# Patient Record
Sex: MALE | Race: WHITE | NOT HISPANIC OR LATINO
[De-identification: names, ages, dates, MRNs, and addresses within clinical notes are randomized per-mention and may not be internally consistent; named-entity substitution may affect disease eponyms.]

---

## 2017-06-02 ENCOUNTER — APPOINTMENT (OUTPATIENT)
Dept: CARDIOLOGY | Facility: CLINIC | Age: 72
End: 2017-06-02

## 2017-07-21 ENCOUNTER — APPOINTMENT (OUTPATIENT)
Dept: CARDIOLOGY | Facility: CLINIC | Age: 72
End: 2017-07-21

## 2017-08-25 ENCOUNTER — APPOINTMENT (OUTPATIENT)
Dept: CARDIOLOGY | Facility: CLINIC | Age: 72
End: 2017-08-25
Payer: MEDICARE

## 2017-08-25 PROCEDURE — 99214 OFFICE O/P EST MOD 30 MIN: CPT

## 2017-12-15 ENCOUNTER — APPOINTMENT (OUTPATIENT)
Dept: CARDIOLOGY | Facility: CLINIC | Age: 72
End: 2017-12-15
Payer: MEDICARE

## 2017-12-15 VITALS
BODY MASS INDEX: 33.2 KG/M2 | OXYGEN SATURATION: 98 % | HEIGHT: 75 IN | DIASTOLIC BLOOD PRESSURE: 80 MMHG | HEART RATE: 72 BPM | RESPIRATION RATE: 16 BRPM | SYSTOLIC BLOOD PRESSURE: 130 MMHG | WEIGHT: 267 LBS

## 2017-12-15 DIAGNOSIS — Z87.891 PERSONAL HISTORY OF NICOTINE DEPENDENCE: ICD-10-CM

## 2017-12-15 DIAGNOSIS — K21.9 GASTRO-ESOPHAGEAL REFLUX DISEASE W/OUT ESOPHAGITIS: ICD-10-CM

## 2017-12-15 DIAGNOSIS — Z86.79 PERSONAL HISTORY OF OTHER DISEASES OF THE CIRCULATORY SYSTEM: ICD-10-CM

## 2017-12-15 DIAGNOSIS — Z78.9 OTHER SPECIFIED HEALTH STATUS: ICD-10-CM

## 2017-12-15 DIAGNOSIS — E78.5 HYPERLIPIDEMIA, UNSPECIFIED: ICD-10-CM

## 2017-12-15 DIAGNOSIS — Z86.39 PERSONAL HISTORY OF OTHER ENDOCRINE, NUTRITIONAL AND METABOLIC DISEASE: ICD-10-CM

## 2017-12-15 DIAGNOSIS — Z80.9 FAMILY HISTORY OF MALIGNANT NEOPLASM, UNSPECIFIED: ICD-10-CM

## 2017-12-15 DIAGNOSIS — Z87.442 PERSONAL HISTORY OF URINARY CALCULI: ICD-10-CM

## 2017-12-15 PROCEDURE — 99214 OFFICE O/P EST MOD 30 MIN: CPT

## 2017-12-15 RX ORDER — ALFUZOSIN HYDROCHLORIDE 10 MG/1
10 TABLET, EXTENDED RELEASE ORAL
Qty: 90 | Refills: 0 | Status: ACTIVE | COMMUNITY
Start: 2017-09-13

## 2017-12-15 RX ORDER — ASPIRIN ENTERIC COATED TABLETS 81 MG 81 MG/1
81 TABLET, DELAYED RELEASE ORAL DAILY
Refills: 0 | Status: ACTIVE | COMMUNITY

## 2017-12-15 RX ORDER — METOPROLOL SUCCINATE 50 MG/1
50 TABLET, EXTENDED RELEASE ORAL
Qty: 90 | Refills: 0 | Status: DISCONTINUED | COMMUNITY
Start: 2017-08-25 | End: 2017-12-15

## 2017-12-15 RX ORDER — DOXEPIN HYDROCHLORIDE 10 MG/1
10 CAPSULE ORAL
Qty: 30 | Refills: 0 | Status: DISCONTINUED | COMMUNITY
Start: 2017-09-14 | End: 2017-12-15

## 2018-05-11 ENCOUNTER — NON-APPOINTMENT (OUTPATIENT)
Age: 73
End: 2018-05-11

## 2018-05-11 ENCOUNTER — APPOINTMENT (OUTPATIENT)
Dept: CARDIOLOGY | Facility: CLINIC | Age: 73
End: 2018-05-11
Payer: MEDICARE

## 2018-05-11 VITALS
WEIGHT: 262 LBS | SYSTOLIC BLOOD PRESSURE: 118 MMHG | RESPIRATION RATE: 16 BRPM | BODY MASS INDEX: 32.58 KG/M2 | HEIGHT: 75 IN | HEART RATE: 68 BPM | DIASTOLIC BLOOD PRESSURE: 66 MMHG | OXYGEN SATURATION: 98 %

## 2018-05-11 PROCEDURE — 99214 OFFICE O/P EST MOD 30 MIN: CPT

## 2018-05-11 PROCEDURE — 93000 ELECTROCARDIOGRAM COMPLETE: CPT

## 2018-07-22 PROBLEM — Z78.9 ALCOHOL USE: Status: ACTIVE | Noted: 2017-12-15

## 2018-07-23 ENCOUNTER — APPOINTMENT (OUTPATIENT)
Dept: CARDIOLOGY | Facility: CLINIC | Age: 73
End: 2018-07-23
Payer: MEDICARE

## 2018-07-23 PROCEDURE — 93306 TTE W/DOPPLER COMPLETE: CPT

## 2018-08-02 ENCOUNTER — CLINICAL ADVICE (OUTPATIENT)
Age: 73
End: 2018-08-02

## 2018-08-24 ENCOUNTER — APPOINTMENT (OUTPATIENT)
Dept: CARDIOLOGY | Facility: CLINIC | Age: 73
End: 2018-08-24
Payer: MEDICARE

## 2018-08-24 VITALS
BODY MASS INDEX: 32.83 KG/M2 | HEIGHT: 75 IN | WEIGHT: 264 LBS | SYSTOLIC BLOOD PRESSURE: 120 MMHG | HEART RATE: 62 BPM | OXYGEN SATURATION: 98 % | DIASTOLIC BLOOD PRESSURE: 68 MMHG

## 2018-08-24 PROCEDURE — 99215 OFFICE O/P EST HI 40 MIN: CPT

## 2018-11-19 ENCOUNTER — RX RENEWAL (OUTPATIENT)
Age: 73
End: 2018-11-19

## 2019-05-17 ENCOUNTER — NON-APPOINTMENT (OUTPATIENT)
Age: 74
End: 2019-05-17

## 2019-05-17 ENCOUNTER — APPOINTMENT (OUTPATIENT)
Dept: CARDIOLOGY | Facility: CLINIC | Age: 74
End: 2019-05-17
Payer: MEDICARE

## 2019-05-17 VITALS
WEIGHT: 259 LBS | HEART RATE: 66 BPM | HEIGHT: 75 IN | BODY MASS INDEX: 32.2 KG/M2 | DIASTOLIC BLOOD PRESSURE: 66 MMHG | OXYGEN SATURATION: 96 % | SYSTOLIC BLOOD PRESSURE: 114 MMHG

## 2019-05-17 PROCEDURE — 99215 OFFICE O/P EST HI 40 MIN: CPT

## 2019-05-17 PROCEDURE — 93000 ELECTROCARDIOGRAM COMPLETE: CPT

## 2019-05-17 NOTE — HISTORY OF PRESENT ILLNESS
[FreeTextEntry1] : HPI:\par •Thoracic aortic aneurysm without mention of rupture, being followed. 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

## 2019-05-17 NOTE — PHYSICAL EXAM
[General Appearance - Well Developed] : well developed [Normal Appearance] : normal appearance [Well Groomed] : well groomed [General Appearance - Well Nourished] : well nourished [No Deformities] : no deformities [General Appearance - In No Acute Distress] : no acute distress [Normal Conjunctiva] : the conjunctiva exhibited no abnormalities [Eyelids - No Xanthelasma] : the eyelids demonstrated no xanthelasmas [No Oral Pallor] : no oral pallor [Normal Jugular Venous A Waves Present] : normal jugular venous A waves present [Normal Jugular Venous V Waves Present] : normal jugular venous V waves present [No Jugular Venous Culver A Waves] : no jugular venous culver A waves [Respiration, Rhythm And Depth] : normal respiratory rhythm and effort [Auscultation Breath Sounds / Voice Sounds] : lungs were clear to auscultation bilaterally [Exaggerated Use Of Accessory Muscles For Inspiration] : no accessory muscle use [Heart Rate And Rhythm] : heart rate and rhythm were normal [Arterial Pulses Normal] : the arterial pulses were normal [Heart Sounds] : normal S1 and S2 [Veins - Varicosity Changes] : no varicosital changes were noted in the lower extremities [Edema] : no peripheral edema present [FreeTextEntry1] : savana 1-2/6 at the base, no s3 s4 gallop, rub, dennys. [Bowel Sounds] : normal bowel sounds [Abdomen Soft] : soft [Abnormal Walk] : normal gait [Gait - Sufficient For Exercise Testing] : the gait was sufficient for exercise testing [Cyanosis, Localized] : no localized cyanosis [Nail Clubbing] : no clubbing of the fingernails [Skin Color & Pigmentation] : normal skin color and pigmentation [] : no rash [No Venous Stasis] : no venous stasis [No Xanthoma] : no  xanthoma was observed [Oriented To Time, Place, And Person] : oriented to person, place, and time [Mood] : the mood was normal [Affect] : the affect was normal [No Anxiety] : not feeling anxious

## 2019-05-17 NOTE — REASON FOR VISIT
[Hyperlipidemia] : hyperlipidemia [Follow-Up - Clinic] : a clinic follow-up of [Hypertension] : hypertension [FreeTextEntry1] : Thoracic aortic aneurysm  [Spouse] : spouse

## 2019-05-17 NOTE — ASSESSMENT
[FreeTextEntry1] : past tests for reference;\par \par CTA of the abdomen and pelvis, multiple bilateral pararenal cysts. No evidence of hydronephrosis. Enlarged prostate and bilateral hernias.\par \par CTA of the chest with contrast, which was done on August 13, 2015, aortic root just above the aortic valve was 4.7 cm, proximal aorta 3.4 cm, descending aorta 3 cm, thoracic aorta otherwise 2.5 cm.\par \par Echocardiogram, July 31, 2014, LV function normal, mild-to-moderate aortic insufficiency, aortic root 4.8 cm.\par \par Carotid Doppler study, July 31, 2014, mild left ICA stenosis of less than 50%.\par \par Stress myocardial perfusion scan, July 31, 2014, normal perfusions, 74% LV ejection fraction, it was done with pharmacological means. EF of 74%.\par EKG ordered and interpreted by me on 8/5/2016 indication hypertension.  End dictation.  Normal sinus rhythm.  Leftward axis.  No acute ascitic wave changes.\par Echocardiogram.  6/26/2016.  EF 60%.  Trace to mild mitral regurgitation mild to moderate aortic regurgitation mild tricuspid regurgitation.  Ascending aorta 3.9 cm.  Aortic root 4.1 cm.\par Carotid Doppler study 6/29/2016.  Mild carotid atherosclerosis.\par 5/17 CT of the chest and abdomen. Ascending aorta 4.3 cm. Aortic root 4.8 cm celiac artery 1.8 cm.\par Labs May 24, 2017. Creatinine 0.9. Potassium 4.7. Sodium 142. \par  HDL to 146. Triglycerides 60. Total cholesterol 116\par \par Echocardiogram. Reviewed on August 25, 2017. It was interpreted on July 21, 2017 LVEF 60%. Mild left atrial enlargement. Aortic root 4.7-4.9 cm. A setting of a 4.4 cm. Mild left atrial enlargement. Mild to moderate aortic regurgitation. Mild tricuspid regurgitation. Normal pulmonary pressures.\par \par lexiscan myocardial perfusion scan. July 21, 2017. Non ischemic symptoms. Non ischemic EKG portion of the test. Myocardial perfusion scan shows inferior attenuation otherwise no significant high risk ischemia or infarction. LV ejection fraction 55% \par \par Reviewed on May 11, 2018.\par CMP is stable.\par LDL 58, AST of 39, triglycerides 88, total cholesterol 1:15. Labs done on April 30, 2018.\par EKG ordered and interpreted by me. May 11, 2018. Indication hypertension. Interpretation. Normal sinus rhythm. Leftward axis. No significant changes.\par \par Reviewed on August 24, 2018.\par Vascular surgical consultation and reviewed.\par Echocardiogram July 23, 2018. LV ejection fraction is around normal. Aortic root 4.6 cm. Ascending aorta 4.4 cm. Mild to moderate aortic insufficiency. Mild mitral regurgitation.\par \par Reviewed on May 17, 2019\par EKG May 17, 2019 P. indication hypertension. PA interpretation. Normal sinus rhythm.\par Labs May 9, 2019. Stable. CMP. Stable lipid panel.

## 2019-06-24 ENCOUNTER — RX RENEWAL (OUTPATIENT)
Age: 74
End: 2019-06-24

## 2019-07-15 ENCOUNTER — APPOINTMENT (OUTPATIENT)
Dept: CARDIOLOGY | Facility: CLINIC | Age: 74
End: 2019-07-15
Payer: MEDICARE

## 2019-07-15 PROCEDURE — 93306 TTE W/DOPPLER COMPLETE: CPT

## 2019-07-15 PROCEDURE — 78452 HT MUSCLE IMAGE SPECT MULT: CPT

## 2019-07-15 PROCEDURE — A9502: CPT

## 2019-07-15 PROCEDURE — 93015 CV STRESS TEST SUPVJ I&R: CPT

## 2019-07-26 ENCOUNTER — APPOINTMENT (OUTPATIENT)
Dept: CARDIOLOGY | Facility: CLINIC | Age: 74
End: 2019-07-26
Payer: MEDICARE

## 2019-07-26 VITALS
SYSTOLIC BLOOD PRESSURE: 128 MMHG | HEART RATE: 68 BPM | OXYGEN SATURATION: 98 % | DIASTOLIC BLOOD PRESSURE: 70 MMHG | HEIGHT: 75 IN | BODY MASS INDEX: 31.95 KG/M2 | WEIGHT: 257 LBS

## 2019-07-26 PROCEDURE — 99215 OFFICE O/P EST HI 40 MIN: CPT

## 2019-07-26 NOTE — REASON FOR VISIT
[Follow-Up - Clinic] : a clinic follow-up of [Hyperlipidemia] : hyperlipidemia [Hypertension] : hypertension [FreeTextEntry1] : 73-year-old male comes in for followup consultation reviewed labs, echocardiogram, nuclear marker perfusion scan, CTA of the chest, abdomen, and pelvis.\par He is now stablefatigue, tiredness, and exertional dyspnea than before. He has no cough, fever, or chills. He has no PND, orthopnea, pedal edema. He had melanoma removal and having other dermatological procedures. He has no chest pain or back pain. His weight is stable. 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  [Spouse] : spouse

## 2019-07-26 NOTE — PHYSICAL EXAM
[General Appearance - Well Developed] : well developed [Normal Appearance] : normal appearance [Well Groomed] : well groomed [General Appearance - Well Nourished] : well nourished [No Deformities] : no deformities [General Appearance - In No Acute Distress] : no acute distress [Normal Conjunctiva] : the conjunctiva exhibited no abnormalities [Eyelids - No Xanthelasma] : the eyelids demonstrated no xanthelasmas [Normal Jugular Venous A Waves Present] : normal jugular venous A waves present [No Oral Pallor] : no oral pallor [Normal Jugular Venous V Waves Present] : normal jugular venous V waves present [No Jugular Venous Culver A Waves] : no jugular venous culver A waves [Respiration, Rhythm And Depth] : normal respiratory rhythm and effort [Exaggerated Use Of Accessory Muscles For Inspiration] : no accessory muscle use [Heart Rate And Rhythm] : heart rate and rhythm were normal [Auscultation Breath Sounds / Voice Sounds] : lungs were clear to auscultation bilaterally [Heart Sounds] : normal S1 and S2 [Arterial Pulses Normal] : the arterial pulses were normal [Edema] : no peripheral edema present [Veins - Varicosity Changes] : no varicosital changes were noted in the lower extremities [Bowel Sounds] : normal bowel sounds [FreeTextEntry1] : savana 1-2/6 at the base, no s3 s4 gallop, rub, dennys. [Abdomen Soft] : soft [Gait - Sufficient For Exercise Testing] : the gait was sufficient for exercise testing [Abnormal Walk] : normal gait [Nail Clubbing] : no clubbing of the fingernails [Cyanosis, Localized] : no localized cyanosis [Skin Color & Pigmentation] : normal skin color and pigmentation [] : no rash [No Venous Stasis] : no venous stasis [No Xanthoma] : no  xanthoma was observed [Oriented To Time, Place, And Person] : oriented to person, place, and time [Mood] : the mood was normal [Affect] : the affect was normal [No Anxiety] : not feeling anxious

## 2019-07-26 NOTE — DISCUSSION/SUMMARY
[FreeTextEntry1] : #1Labs reviewed stable. Findings, discussed.\par #2 thoracic aortic aneurysm, stable Stable CTA with 4.8 cm aortic root 4.4 cm ascending aorta similar dimension. On echocardiogram with mild to moderate aortic insufficiency was noted.\par Continue to follow closely. 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 Avoid isometric exercises\par #3 hypertensive heart disease. Without congestive heart failure or renal insufficiency appears stable. Blood pressure. Continue with present medications in the form of metoprolol.\par #4 hyperlipidemia. Continue atorvastatin.\par #5Mild mitral regurgitation, mild to moderate aortic insufficiency. Continue to follow.\par #6 weight reduction.\par #7 Nuclear myocardial perfusion scan reviewed. Limitation of the test, discussed via change in clinical status may require invasive evaluation. Otherwise, continue with aggressive lifestyle, and Risk factor modification\par \par Counseling regarding low saturated fat, salt and carbohydrate intake was reviewed. Active lifestyle and regular. Exercise along with weight management is advised.\par \par All the above were at length reviewed. Answered all the questions. Thank you very much for this kind referral. Please do not hesitate to give me a call for any question.\par Part of this transcription was done with voice recognition software and phonetically similar errors are common. I apologize for that. Please donot hesitate to call for any questions due to above.\par \par f/u  a6 months

## 2019-07-26 NOTE — ASSESSMENT
[FreeTextEntry1] : past tests for reference;\par \par CTA of the abdomen and pelvis, multiple bilateral pararenal cysts. No evidence of hydronephrosis. Enlarged prostate and bilateral hernias.\par \par CTA of the chest with contrast, which was done on August 13, 2015, aortic root just above the aortic valve was 4.7 cm, proximal aorta 3.4 cm, descending aorta 3 cm, thoracic aorta otherwise 2.5 cm.\par \par Echocardiogram, July 31, 2014, LV function normal, mild-to-moderate aortic insufficiency, aortic root 4.8 cm.\par \par Carotid Doppler study, July 31, 2014, mild left ICA stenosis of less than 50%.\par \par Stress myocardial perfusion scan, July 31, 2014, normal perfusions, 74% LV ejection fraction, it was done with pharmacological means. EF of 74%.\par EKG ordered and interpreted by me on 8/5/2016 indication hypertension.  End dictation.  Normal sinus rhythm.  Leftward axis.  No acute ascitic wave changes.\par Echocardiogram.  6/26/2016.  EF 60%.  Trace to mild mitral regurgitation mild to moderate aortic regurgitation mild tricuspid regurgitation.  Ascending aorta 3.9 cm.  Aortic root 4.1 cm.\par Carotid Doppler study 6/29/2016.  Mild carotid atherosclerosis.\par 5/17 CT of the chest and abdomen. Ascending aorta 4.3 cm. Aortic root 4.8 cm celiac artery 1.8 cm.\par Labs May 24, 2017. Creatinine 0.9. Potassium 4.7. Sodium 142. \par  HDL to 146. Triglycerides 60. Total cholesterol 116\par \par Echocardiogram. Reviewed on August 25, 2017. It was interpreted on July 21, 2017 LVEF 60%. Mild left atrial enlargement. Aortic root 4.7-4.9 cm. A setting of a 4.4 cm. Mild left atrial enlargement. Mild to moderate aortic regurgitation. Mild tricuspid regurgitation. Normal pulmonary pressures.\par \par lexiscan myocardial perfusion scan. July 21, 2017. Non ischemic symptoms. Non ischemic EKG portion of the test. Myocardial perfusion scan shows inferior attenuation otherwise no significant high risk ischemia or infarction. LV ejection fraction 55% \par \par Reviewed on May 11, 2018.\par CMP is stable.\par LDL 58, AST of 39, triglycerides 88, total cholesterol 1:15. Labs done on April 30, 2018.\par EKG ordered and interpreted by me. May 11, 2018. Indication hypertension. Interpretation. Normal sinus rhythm. Leftward axis. No significant changes.\par \par Reviewed on August 24, 2018.\par Vascular surgical consultation and reviewed.\par Echocardiogram July 23, 2018. LV ejection fraction is around normal. Aortic root 4.6 cm. Ascending aorta 4.4 cm. Mild to moderate aortic insufficiency. Mild mitral regurgitation.\par \par Review  on July 26, 2019\par CT of chest, abdomen, and pelvis, May 24, 2019 maximum diameter of aortic root 4.8 cm ascending aorta 4.4 cm. Stable celiac artery aneurysm.\par Echocardiogram July 15, 2019. Ejection fraction 55-60%. Aortic root 4.8 cm ascending 4.4 cm mild to moderate aortic insufficiency and mild mitral regurgitation.\par Nuclear myocardial perfusion  July 15, 2019. Pharmacological means. Most likely nonischemic in presence of inferior attenuation P. LV ejection fraction 57%.\par Labs May 2019 were reviewed, showing stable CBC, CMP, and lipid panel.

## 2019-07-26 NOTE — PHYSICAL EXAM
[General Appearance - Well Developed] : well developed [Well Groomed] : well groomed [Normal Appearance] : normal appearance [General Appearance - Well Nourished] : well nourished [No Deformities] : no deformities [General Appearance - In No Acute Distress] : no acute distress [Normal Conjunctiva] : the conjunctiva exhibited no abnormalities [Eyelids - No Xanthelasma] : the eyelids demonstrated no xanthelasmas [Normal Jugular Venous A Waves Present] : normal jugular venous A waves present [No Oral Pallor] : no oral pallor [Normal Jugular Venous V Waves Present] : normal jugular venous V waves present [No Jugular Venous Culver A Waves] : no jugular venous culver A waves [Respiration, Rhythm And Depth] : normal respiratory rhythm and effort [Exaggerated Use Of Accessory Muscles For Inspiration] : no accessory muscle use [Heart Sounds] : normal S1 and S2 [Auscultation Breath Sounds / Voice Sounds] : lungs were clear to auscultation bilaterally [Heart Rate And Rhythm] : heart rate and rhythm were normal [Arterial Pulses Normal] : the arterial pulses were normal [Edema] : no peripheral edema present [Veins - Varicosity Changes] : no varicosital changes were noted in the lower extremities [Bowel Sounds] : normal bowel sounds [FreeTextEntry1] : savana 1-2/6 at the base, no s3 s4 gallop, rub, dennys. [Abdomen Soft] : soft [Gait - Sufficient For Exercise Testing] : the gait was sufficient for exercise testing [Abnormal Walk] : normal gait [Nail Clubbing] : no clubbing of the fingernails [Cyanosis, Localized] : no localized cyanosis [Skin Color & Pigmentation] : normal skin color and pigmentation [] : no rash [No Venous Stasis] : no venous stasis [Oriented To Time, Place, And Person] : oriented to person, place, and time [No Xanthoma] : no  xanthoma was observed [Affect] : the affect was normal [Mood] : the mood was normal [No Anxiety] : not feeling anxious

## 2019-10-22 ENCOUNTER — MOBILE ON CALL (OUTPATIENT)
Age: 74
End: 2019-10-22

## 2019-12-27 ENCOUNTER — APPOINTMENT (OUTPATIENT)
Dept: CARDIOLOGY | Facility: CLINIC | Age: 74
End: 2019-12-27
Payer: MEDICARE

## 2019-12-27 VITALS
DIASTOLIC BLOOD PRESSURE: 74 MMHG | OXYGEN SATURATION: 97 % | HEIGHT: 75 IN | SYSTOLIC BLOOD PRESSURE: 122 MMHG | WEIGHT: 259 LBS | HEART RATE: 61 BPM | BODY MASS INDEX: 32.2 KG/M2

## 2019-12-27 PROCEDURE — 99214 OFFICE O/P EST MOD 30 MIN: CPT

## 2019-12-27 NOTE — REASON FOR VISIT
[Follow-Up - Clinic] : a clinic follow-up of [Hypertension] : hypertension [Hyperlipidemia] : hyperlipidemia [FreeTextEntry1] : 74-year-old male comes in for followup consultation reviewed labs, echocardiogram, nuclear marker perfusion scan, CTA of the chest, abdomen, and pelvis.\par He is now stable fatigue, tiredness, and exertional dyspnea than before. He has no cough, fever, or chills. He has no PND, orthopnea, pedal edema. He had melanoma removal and having other dermatological procedures. He has no chest pain or back pain. His weight is stable. 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 \par \par Patient had requested to stop statin during Oct 2019 visit, 12/19/2019 labs reviewed , TG 62, HLD 44, LDL 54. but he reported it was only for a short period of time, and self restarted statin without noticing any differences in feelings or issues.

## 2019-12-27 NOTE — PHYSICAL EXAM
[General Appearance - Well Developed] : well developed [Normal Appearance] : normal appearance [General Appearance - Well Nourished] : well nourished [Well Groomed] : well groomed [General Appearance - In No Acute Distress] : no acute distress [No Deformities] : no deformities [Normal Conjunctiva] : the conjunctiva exhibited no abnormalities [Eyelids - No Xanthelasma] : the eyelids demonstrated no xanthelasmas [Normal Jugular Venous V Waves Present] : normal jugular venous V waves present [Normal Jugular Venous A Waves Present] : normal jugular venous A waves present [No Oral Pallor] : no oral pallor [No Jugular Venous Culver A Waves] : no jugular venous culver A waves [Respiration, Rhythm And Depth] : normal respiratory rhythm and effort [Auscultation Breath Sounds / Voice Sounds] : lungs were clear to auscultation bilaterally [Exaggerated Use Of Accessory Muscles For Inspiration] : no accessory muscle use [Heart Sounds] : normal S1 and S2 [Heart Rate And Rhythm] : heart rate and rhythm were normal [Arterial Pulses Normal] : the arterial pulses were normal [Edema] : no peripheral edema present [Veins - Varicosity Changes] : no varicosital changes were noted in the lower extremities [Bowel Sounds] : normal bowel sounds [Abdomen Soft] : soft [Abnormal Walk] : normal gait [Gait - Sufficient For Exercise Testing] : the gait was sufficient for exercise testing [Cyanosis, Localized] : no localized cyanosis [Nail Clubbing] : no clubbing of the fingernails [] : no rash [Skin Color & Pigmentation] : normal skin color and pigmentation [No Venous Stasis] : no venous stasis [No Xanthoma] : no  xanthoma was observed [Oriented To Time, Place, And Person] : oriented to person, place, and time [Affect] : the affect was normal [No Anxiety] : not feeling anxious [Mood] : the mood was normal [FreeTextEntry1] : savana 1-2/6 at the base, no s3 s4 gallop, rub, dennys.

## 2019-12-27 NOTE — DISCUSSION/SUMMARY
[FreeTextEntry1] : #1Labs 12/19/2019 reviewed stable. Findings, discussed.\par #2 thoracic aortic aneurysm, stable Stable CTA with 4.8 cm aortic root 4.4 cm ascending aorta similar dimension. On echocardiogram with mild to moderate aortic insufficiency was noted.\par Continue to follow closely. 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 Avoid isometric exercises\par #3 hypertensive heart disease. Without congestive heart failure or renal insufficiency appears stable. Blood pressure. Continue with present medications in the form of metoprolol.\par #4 hyperlipidemia. Continue atorvastatin. patient understands the importance to continue on lipitor with PHM aneurysms.\par #5 Mild mitral regurgitation, mild to moderate aortic insufficiency. Continue to follow.\par #6 weight reduction.\par #7 Nuclear myocardial perfusion scan reviewed. EF 57%, non ischemic\par \par Counseling regarding low saturated fat, salt and carbohydrate intake was reviewed. Active lifestyle and regular. Exercise along with weight management is advised.\par \par All the above were at length reviewed. Answered all the questions. Thank you very much for this kind referral. Please do not hesitate to give me a call for any question.\par Part of this transcription was done with voice recognition software and phonetically similar errors are common. I apologize for that. Please do not hesitate to call for any questions due to above.\par \par lab slip provided today visit for CMP, CBC, Lipids in 5 months to be reviewed at 6 months follow up,\par Follow up in  6 months and will reevaluate plans for CT Cx and Abdo, ECHO, and Carotid US. \par \par Sincerely,\par \par JULIO CESAR Carrasquillo\par Patients history, testing, and plan reviewed with supervising MD: Dr. Astrid Morris\par

## 2020-06-22 ENCOUNTER — RX RENEWAL (OUTPATIENT)
Age: 75
End: 2020-06-22

## 2020-06-29 ENCOUNTER — NON-APPOINTMENT (OUTPATIENT)
Age: 75
End: 2020-06-29

## 2020-06-29 ENCOUNTER — APPOINTMENT (OUTPATIENT)
Dept: CARDIOLOGY | Facility: CLINIC | Age: 75
End: 2020-06-29
Payer: MEDICARE

## 2020-06-29 VITALS
WEIGHT: 242 LBS | HEART RATE: 59 BPM | SYSTOLIC BLOOD PRESSURE: 120 MMHG | HEIGHT: 75 IN | TEMPERATURE: 98 F | DIASTOLIC BLOOD PRESSURE: 68 MMHG | OXYGEN SATURATION: 98 % | BODY MASS INDEX: 30.09 KG/M2

## 2020-06-29 DIAGNOSIS — R05 COUGH: ICD-10-CM

## 2020-06-29 PROCEDURE — 99215 OFFICE O/P EST HI 40 MIN: CPT

## 2020-06-29 PROCEDURE — 93000 ELECTROCARDIOGRAM COMPLETE: CPT

## 2020-06-29 RX ORDER — OMEPRAZOLE 20 MG/1
20 CAPSULE, DELAYED RELEASE ORAL
Qty: 30 | Refills: 0 | Status: ACTIVE | COMMUNITY
Start: 2020-05-26

## 2020-06-29 NOTE — PHYSICAL EXAM
[General Appearance - Well Developed] : well developed [Well Groomed] : well groomed [General Appearance - Well Nourished] : well nourished [Normal Appearance] : normal appearance [No Deformities] : no deformities [Normal Conjunctiva] : the conjunctiva exhibited no abnormalities [General Appearance - In No Acute Distress] : no acute distress [Normal Jugular Venous A Waves Present] : normal jugular venous A waves present [Eyelids - No Xanthelasma] : the eyelids demonstrated no xanthelasmas [No Oral Pallor] : no oral pallor [No Jugular Venous Culver A Waves] : no jugular venous culver A waves [Normal Jugular Venous V Waves Present] : normal jugular venous V waves present [Respiration, Rhythm And Depth] : normal respiratory rhythm and effort [Auscultation Breath Sounds / Voice Sounds] : lungs were clear to auscultation bilaterally [Exaggerated Use Of Accessory Muscles For Inspiration] : no accessory muscle use [Heart Sounds] : normal S1 and S2 [Heart Rate And Rhythm] : heart rate and rhythm were normal [Edema] : no peripheral edema present [Arterial Pulses Normal] : the arterial pulses were normal [Veins - Varicosity Changes] : no varicosital changes were noted in the lower extremities [Bowel Sounds] : normal bowel sounds [Abdomen Soft] : soft [Gait - Sufficient For Exercise Testing] : the gait was sufficient for exercise testing [Abnormal Walk] : normal gait [Skin Color & Pigmentation] : normal skin color and pigmentation [Nail Clubbing] : no clubbing of the fingernails [Cyanosis, Localized] : no localized cyanosis [] : no rash [No Venous Stasis] : no venous stasis [No Xanthoma] : no  xanthoma was observed [Oriented To Time, Place, And Person] : oriented to person, place, and time [No Anxiety] : not feeling anxious [Mood] : the mood was normal [Affect] : the affect was normal [FreeTextEntry1] : savana 1-2/6 at the base, no s3 s4 gallop, rub, dennys.

## 2020-06-29 NOTE — ASSESSMENT
[FreeTextEntry1] : past tests for reference;\par \par CTA of the abdomen and pelvis, multiple bilateral pararenal cysts. No evidence of hydronephrosis. Enlarged prostate and bilateral hernias.\par \par CTA of the chest with contrast, which was done on August 13, 2015, aortic root just above the aortic valve was 4.7 cm, proximal aorta 3.4 cm, descending aorta 3 cm, thoracic aorta otherwise 2.5 cm.\par \par Echocardiogram, July 31, 2014, LV function normal, mild-to-moderate aortic insufficiency, aortic root 4.8 cm.\par \par Carotid Doppler study, July 31, 2014, mild left ICA stenosis of less than 50%.\par \par Stress myocardial perfusion scan, July 31, 2014, normal perfusions, 74% LV ejection fraction, it was done with pharmacological means. EF of 74%.\par EKG ordered and interpreted by me on 8/5/2016 indication hypertension.  End dictation.  Normal sinus rhythm.  Leftward axis.  No acute ascitic wave changes.\par Echocardiogram.  6/26/2016.  EF 60%.  Trace to mild mitral regurgitation mild to moderate aortic regurgitation mild tricuspid regurgitation.  Ascending aorta 3.9 cm.  Aortic root 4.1 cm.\par Carotid Doppler study 6/29/2016.  Mild carotid atherosclerosis.\par 5/17 CT of the chest and abdomen. Ascending aorta 4.3 cm. Aortic root 4.8 cm celiac artery 1.8 cm.\par Labs May 24, 2017. Creatinine 0.9. Potassium 4.7. Sodium 142. \par  HDL to 146. Triglycerides 60. Total cholesterol 116\par \par Echocardiogram. Reviewed on August 25, 2017. It was interpreted on July 21, 2017 LVEF 60%. Mild left atrial enlargement. Aortic root 4.7-4.9 cm. A setting of a 4.4 cm. Mild left atrial enlargement. Mild to moderate aortic regurgitation. Mild tricuspid regurgitation. Normal pulmonary pressures.\par \par lexiscan myocardial perfusion scan. July 21, 2017. Non ischemic symptoms. Non ischemic EKG portion of the test. Myocardial perfusion scan shows inferior attenuation otherwise no significant high risk ischemia or infarction. LV ejection fraction 55% \par \par Reviewed on May 11, 2018.\par CMP is stable.\par LDL 58, AST of 39, triglycerides 88, total cholesterol 1:15. Labs done on April 30, 2018.\par EKG ordered and interpreted by me. May 11, 2018. Indication hypertension. Interpretation. Normal sinus rhythm. Leftward axis. No significant changes.\par \par Reviewed on August 24, 2018.\par Vascular surgical consultation and reviewed.\par Echocardiogram July 23, 2018. LV ejection fraction is around normal. Aortic root 4.6 cm. Ascending aorta 4.4 cm. Mild to moderate aortic insufficiency. Mild mitral regurgitation.\par \par Review  on July 26, 2019\par CT of chest, abdomen, and pelvis, May 24, 2019 maximum diameter of aortic root 4.8 cm ascending aorta 4.4 cm. Stable celiac artery aneurysm.\par Echocardiogram July 15, 2019. Ejection fraction 55-60%. Aortic root 4.8 cm ascending 4.4 cm mild to moderate aortic insufficiency and mild mitral regurgitation.\par Nuclear myocardial perfusion  July 15, 2019. Pharmacological means. Most likely nonischemic in presence of inferior attenuation P. LV ejection fraction 57%.\par Labs May 2019 were reviewed, showing stable CBC, CMP, and lipid panel.\par \par Reviewed on June 29, 2020.\par EKG as noted above\par Labs June 18, 2020.  Stable CMP.  Total cholesterol 1038 triglycerides 55 HDL 43 LDL 49 stable CBC

## 2020-06-29 NOTE — DISCUSSION/SUMMARY
[FreeTextEntry1] : 74-year-old with above medical history and active medical problems \par #1Labs reviewed stable.  EKG reviewed stable.\par #2 thoracic aortic aneurysm,  CTA with 4.8 cm aortic root 4.4 cm ascending aorta similar dimension. On echocardiogram with mild to moderate aortic insufficiency was noted.\par Continue to follow closely.  And will be scheduled again for 2020 in June with CTA and in November December with echocardiogram.  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 Avoid isometric exercises\par Reviewed risk benefits alternatives in relation to contrast CTA, radiation.\par #3 hypertensive heart disease. Without congestive heart failure or renal insufficiency appears stable. Blood pressure. Continue with present medications in the form of metoprolol.\par #4 hyperlipidemia. Continue atorvastatin.\par #5Mild mitral regurgitation, mild to moderate aortic insufficiency. Continue to follow.\par #6  Continued weight reduction.\par #7 chronic cough recently for last couple of months.  No hemoptysis.  He does have postnasal drip.  And according to him similar to his allergic symptoms every year just lasted a bit longer this year.  Flonase recommended.  Recommended to see your office or other specialist that pulmonologist if no improvement in next 2 to 3 weeks.  He may need further evaluation with pulmonary function tests, chest x-ray or CT scan of the chest, gastroesophageal reflux disease management etc.\par \par \par Counseling regarding low saturated fat, salt and carbohydrate intake was reviewed. Active lifestyle and regular. Exercise along with weight management is advised.\par \par All the above were at length reviewed. Answered all the questions. Thank you very much for this kind referral. Please do not hesitate to give me a call for any question.\par Part of this transcription was done with voice recognition software and phonetically similar errors are common. I apologize for that. Please donot hesitate to call for any questions due to above.\par \par f/u  6 months

## 2020-06-29 NOTE — REASON FOR VISIT
[Hyperlipidemia] : hyperlipidemia [Follow-Up - Clinic] : a clinic follow-up of [Hypertension] : hypertension [Spouse] : spouse [FreeTextEntry1] : 74-year-old male comes in for followup consultation reviewed labs, \par He is now stablefatigue, tiredness, and exertional dyspnea than before.  He has dry cough without hemoptysis, fever, or chills.  It is associated with postnasal drip.  And allergic rhinitis type of symptoms.  He has no hemoptysis.  \par Recently has been walking more.  Controlling his diet.  And has lost 20 pounds.\par He has no PND, orthopnea, pedal edema.. He has no chest pain or back pain. His weight is stable. 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

## 2020-12-11 ENCOUNTER — APPOINTMENT (OUTPATIENT)
Dept: CARDIOLOGY | Facility: CLINIC | Age: 75
End: 2020-12-11
Payer: MEDICARE

## 2020-12-11 PROCEDURE — 93306 TTE W/DOPPLER COMPLETE: CPT

## 2020-12-23 ENCOUNTER — APPOINTMENT (OUTPATIENT)
Dept: CARDIOLOGY | Facility: CLINIC | Age: 75
End: 2020-12-23

## 2020-12-29 ENCOUNTER — APPOINTMENT (OUTPATIENT)
Dept: CARDIOLOGY | Facility: CLINIC | Age: 75
End: 2020-12-29
Payer: MEDICARE

## 2020-12-29 ENCOUNTER — NON-APPOINTMENT (OUTPATIENT)
Age: 75
End: 2020-12-29

## 2020-12-29 VITALS
TEMPERATURE: 97.3 F | BODY MASS INDEX: 28.35 KG/M2 | SYSTOLIC BLOOD PRESSURE: 132 MMHG | WEIGHT: 228 LBS | HEIGHT: 75 IN | HEART RATE: 63 BPM | OXYGEN SATURATION: 98 % | DIASTOLIC BLOOD PRESSURE: 82 MMHG

## 2020-12-29 PROCEDURE — 93000 ELECTROCARDIOGRAM COMPLETE: CPT

## 2020-12-29 PROCEDURE — 99215 OFFICE O/P EST HI 40 MIN: CPT | Mod: 25

## 2020-12-29 RX ORDER — FLUTICASONE FUROATE 27.5 UG/1
27.5 SPRAY, METERED NASAL DAILY
Qty: 1 | Refills: 1 | Status: DISCONTINUED | COMMUNITY
Start: 2020-06-29 | End: 2020-12-29

## 2020-12-29 RX ORDER — DESONIDE 0.5 MG/G
0.05 CREAM TOPICAL
Qty: 60 | Refills: 0 | Status: DISCONTINUED | COMMUNITY
Start: 2020-01-10 | End: 2020-12-29

## 2020-12-29 NOTE — HISTORY OF PRESENT ILLNESS
[FreeTextEntry1] : 75 year old male with PMHx of Thoracic aortic aneurysm without mention of rupture, being followed. Dimensions have been stable, celiac/illiac aneurysm 1.6 cm, dyslipidemia, on statin therapy. There is no history of MI, CVA, CHF, or previous coronary intervention\par \par Patient with no chest pain, SOB, or palpitations. No hospitalizations since seeing me last. Remains compliant with his medications and reports no adverse effects.\par \par \par \par \par

## 2020-12-29 NOTE — REVIEW OF SYSTEMS
[see HPI] : see HPI [Negative] : Heme/Lymph [Memory Lapses Or Loss] : memory lapses or loss [Anxiety] : no anxiety [Under Stress] : not under stress

## 2020-12-29 NOTE — PHYSICAL EXAM
[General Appearance - Well Developed] : well developed [Normal Appearance] : normal appearance [Well Groomed] : well groomed [General Appearance - Well Nourished] : well nourished [No Deformities] : no deformities [General Appearance - In No Acute Distress] : no acute distress [Normal Conjunctiva] : the conjunctiva exhibited no abnormalities [Eyelids - No Xanthelasma] : the eyelids demonstrated no xanthelasmas [Normal Oral Mucosa] : normal oral mucosa [No Oral Pallor] : no oral pallor [Respiration, Rhythm And Depth] : normal respiratory rhythm and effort [Exaggerated Use Of Accessory Muscles For Inspiration] : no accessory muscle use [Auscultation Breath Sounds / Voice Sounds] : lungs were clear to auscultation bilaterally [Heart Rate And Rhythm] : heart rate and rhythm were normal [Heart Sounds] : normal S1 and S2 [Edema] : no peripheral edema present [Abnormal Walk] : normal gait [Gait - Sufficient For Exercise Testing] : the gait was sufficient for exercise testing [Nail Clubbing] : no clubbing of the fingernails [Cyanosis, Localized] : no localized cyanosis [Skin Color & Pigmentation] : normal skin color and pigmentation [] : no rash [No Venous Stasis] : no venous stasis [No Xanthoma] : no  xanthoma was observed [Oriented To Time, Place, And Person] : oriented to person, place, and time [Affect] : the affect was normal [Mood] : the mood was normal [No Anxiety] : not feeling anxious [FreeTextEntry1] : No JVD, no carotid artery bruits auscultated bilaterally

## 2020-12-29 NOTE — REASON FOR VISIT
[Follow-Up - Clinic] : a clinic follow-up of [Hyperlipidemia] : hyperlipidemia [Hypertension] : hypertension [FreeTextEntry1] : 74-year-old male comes in for followup consultation reviewed labs, \par He is now stablefatigue, tiredness, and exertional dyspnea than before.  He has dry cough without hemoptysis, fever, or chills.  It is associated with postnasal drip.  And allergic rhinitis type of symptoms.  He has no hemoptysis.  \par Recently has been walking more.  Controlling his diet.  And has lost 20 pounds.\par He has no PND, orthopnea, pedal edema.. He has no chest pain or back pain. His weight is stable. 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

## 2021-02-25 NOTE — DISCUSSION/SUMMARY
[FreeTextEntry1] : 1.  Thoracic aortic aneurysm/Celiac Artery Aneurysm:   CTA on 7/15/2020, revealed stable findings. Continue yearly follow up. Continue Toprol XL 50mg daily (high risk medication with no signs of toxicity). Goal BP is less than 130/80 and HRs in the 60s. 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 HTN: continue Toprol XL 50mg daily. Goal BP is less than 130/80.\par \par 3. Hyperlipidemia. Continue atorvastatin.\par \par 4. Aortic insufficiency: periodic echo surveillance. \par \par \par  No

## 2021-06-01 RX ORDER — ATORVASTATIN CALCIUM 20 MG/1
20 TABLET, FILM COATED ORAL
Qty: 90 | Refills: 3 | Status: ACTIVE | COMMUNITY
Start: 2016-12-05 | End: 1900-01-01

## 2021-06-07 ENCOUNTER — APPOINTMENT (OUTPATIENT)
Dept: CARDIOLOGY | Facility: CLINIC | Age: 76
End: 2021-06-07
Payer: MEDICARE

## 2021-06-07 ENCOUNTER — NON-APPOINTMENT (OUTPATIENT)
Age: 76
End: 2021-06-07

## 2021-06-07 VITALS
TEMPERATURE: 98.6 F | HEIGHT: 75 IN | DIASTOLIC BLOOD PRESSURE: 80 MMHG | SYSTOLIC BLOOD PRESSURE: 140 MMHG | HEART RATE: 63 BPM | OXYGEN SATURATION: 97 % | BODY MASS INDEX: 27.1 KG/M2 | WEIGHT: 218 LBS

## 2021-06-07 PROCEDURE — 93000 ELECTROCARDIOGRAM COMPLETE: CPT

## 2021-06-07 PROCEDURE — 99215 OFFICE O/P EST HI 40 MIN: CPT | Mod: 25

## 2021-06-07 RX ORDER — GALANTAMINE HYDROBROMIDE 8 MG/1
8 TABLET, FILM COATED ORAL DAILY
Refills: 0 | Status: DISCONTINUED | COMMUNITY
End: 2021-06-07

## 2021-06-07 NOTE — CARDIOLOGY SUMMARY
[de-identified] : 06/7/2021, NSR, low voltages. [de-identified] : 12/11/2020, trace MR, mild-moderate AI, aortic root 4.8cm, ascending aorta at 4.6cm, trace TR with normal estimated PASP. LVEF 60-65%.

## 2021-06-07 NOTE — DISCUSSION/SUMMARY
[FreeTextEntry1] : 1. Thoracic aortic aneurysm/Celiac Artery Aneurysm: CTA on 7/15/2020, revealed stable findings. Continue yearly follow up. Continue Toprol XL 50mg daily (high risk medication with no signs of toxicity). Goal BP is less than 130/80 and HRs in the 60s. 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 HTN: continue Toprol XL 50mg daily. Goal BP is less than 130/80.\par \par 3. Hyperlipidemia. Continue atorvastatin.\par \par 4. Aortic insufficiency: periodic echo surveillance. \par \par Follow up in 6 months.

## 2021-06-07 NOTE — PHYSICAL EXAM
[Normal] : moves all extremities, no focal deficits, normal speech [de-identified] : No JVD, no carotid artery bruits auscultated bilaterally

## 2021-06-07 NOTE — HISTORY OF PRESENT ILLNESS
[FreeTextEntry1] : Historical Perspective:\par 75 year old male with PMHx of Thoracic aortic aneurysm without mention of rupture, being followed. Dimensions have been stable, celiac/illiac aneurysm 1.6 cm, dyslipidemia, on statin therapy. There is no history of MI, CVA, CHF, or previous coronary intervention\par \par Patient with no chest pain, SOB, or palpitations. No hospitalizations since seeing me last. Remains compliant with his medications and reports no adverse effects. Patient has lost weight through diet and increasing his physical activity levels.

## 2021-06-07 NOTE — REVIEW OF SYSTEMS
[Joint Pain] : joint pain [Joint Stiffness] : joint stiffness [Memory Lapses Or Loss] : memory lapses or loss [Easy Bleeding] : no tendency for easy bleeding [Easy Bruising] : a tendency for easy bruising [Negative] : Neurological

## 2021-07-26 ENCOUNTER — APPOINTMENT (OUTPATIENT)
Dept: OPHTHALMOLOGY | Facility: CLINIC | Age: 76
End: 2021-07-26
Payer: MEDICARE

## 2021-07-26 ENCOUNTER — NON-APPOINTMENT (OUTPATIENT)
Age: 76
End: 2021-07-26

## 2021-07-26 PROCEDURE — 92014 COMPRE OPH EXAM EST PT 1/>: CPT

## 2021-07-27 ENCOUNTER — APPOINTMENT (OUTPATIENT)
Dept: CARDIOLOGY | Facility: CLINIC | Age: 76
End: 2021-07-27
Payer: MEDICARE

## 2021-07-27 VITALS
SYSTOLIC BLOOD PRESSURE: 104 MMHG | WEIGHT: 216 LBS | DIASTOLIC BLOOD PRESSURE: 62 MMHG | TEMPERATURE: 97.8 F | OXYGEN SATURATION: 98 % | BODY MASS INDEX: 27 KG/M2 | HEART RATE: 70 BPM

## 2021-07-27 PROCEDURE — 99215 OFFICE O/P EST HI 40 MIN: CPT

## 2021-07-27 RX ORDER — METOPROLOL SUCCINATE 25 MG/1
25 TABLET, EXTENDED RELEASE ORAL DAILY
Qty: 90 | Refills: 3 | Status: ACTIVE | COMMUNITY
Start: 2020-06-22 | End: 1900-01-01

## 2021-07-27 NOTE — DISCUSSION/SUMMARY
[FreeTextEntry1] : 1. Thoracic aortic aneurysm/Celiac Artery Aneurysm: CTA on 7/15/2021, revealed stable findings. Continue yearly follow up. Recommend reducing dose of Toprol from 50mg--> 25mg daily (high risk medication with no signs of toxicity) because of fatigue and lower PBs.  Goal BP is less than 130/80 and HRs in the 60s. 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 HTN: see plan as above.  Goal BP is less than 130/80.\par \par 3. Hyperlipidemia. Continue atorvastatin 20mg daily.\par \par 4. Aortic insufficiency: periodic echo surveillance. \par \par Follow up in 2 months.

## 2021-07-27 NOTE — PHYSICAL EXAM
[Normal] : moves all extremities, no focal deficits, normal speech [de-identified] : No JVD, no carotid artery bruits auscultated bilaterally

## 2021-07-27 NOTE — CARDIOLOGY SUMMARY
[de-identified] : 06/7/2021, NSR, low voltages. [de-identified] : 12/11/2020, trace MR, mild-moderate AI, aortic root 4.8cm, ascending aorta at 4.6cm, trace TR with normal estimated PASP. LVEF 60-65%.

## 2021-07-27 NOTE — HISTORY OF PRESENT ILLNESS
[FreeTextEntry1] : Historical Perspective:\par 75 year old male with PMHx of Thoracic aortic aneurysm without mention of rupture, being followed. Dimensions have been stable, celiac/illiac aneurysm 1.6 cm, dyslipidemia, on statin therapy. There is no history of MI, CVA, CHF, or previous coronary intervention\par \par Patient with no chest pain, SOB, or palpitations. No hospitalizations since seeing me last. Remains compliant with his medications. He has been feeling fatigued lately. Thinks it might be the metoprolol. BP on low side.  Patient has lost weight through diet and increasing his physical activity levels.

## 2021-09-20 ENCOUNTER — APPOINTMENT (OUTPATIENT)
Dept: CARDIOLOGY | Facility: CLINIC | Age: 76
End: 2021-09-20

## 2021-09-28 ENCOUNTER — APPOINTMENT (OUTPATIENT)
Dept: CARDIOLOGY | Facility: CLINIC | Age: 76
End: 2021-09-28
Payer: MEDICARE

## 2021-09-28 VITALS
WEIGHT: 217 LBS | HEIGHT: 75 IN | DIASTOLIC BLOOD PRESSURE: 76 MMHG | OXYGEN SATURATION: 99 % | TEMPERATURE: 98 F | HEART RATE: 72 BPM | BODY MASS INDEX: 26.98 KG/M2 | SYSTOLIC BLOOD PRESSURE: 132 MMHG

## 2021-09-28 PROCEDURE — 99215 OFFICE O/P EST HI 40 MIN: CPT

## 2021-09-28 RX ORDER — GALANTAMINE HYDROBROMIDE 8 MG/1
8 TABLET, FILM COATED ORAL DAILY
Refills: 0 | Status: ACTIVE | COMMUNITY

## 2021-09-28 NOTE — HISTORY OF PRESENT ILLNESS
[FreeTextEntry1] : Historical Perspective:\par 76 year old male with PMHx of Thoracic aortic aneurysm without mention of rupture, being followed. Dimensions have been stable, celiac/illiac aneurysm 1.6 cm, dyslipidemia, on statin therapy. There is no history of MI, CVA, CHF, or previous coronary intervention\par \par Patient with no chest pain, SOB, or palpitations. No hospitalizations since seeing me last. Remains compliant with his medications. He has been feeling fatigued lately. Thinks it might be the metoprolol. BP on low side.  Patient has lost weight through diet and increasing his physical activity levels. \par \par Current Health Status:\par Patient with no chest pain, SOB, or palpitations. No hospitalizations since seeing me last. Remains compliant with his medications and reports no adverse effects. Since reducing the Metoprolol from 50mg-->25mg daily, patient has noted improvement in fatigue and BP.

## 2021-09-28 NOTE — CARDIOLOGY SUMMARY
[de-identified] : 06/7/2021, NSR, low voltages. [de-identified] : 12/11/2020, trace MR, mild-moderate AI, aortic root 4.8cm, ascending aorta at 4.6cm, trace TR with normal estimated PASP. LVEF 60-65%.

## 2021-09-28 NOTE — REVIEW OF SYSTEMS
[Joint Pain] : joint pain [Joint Stiffness] : joint stiffness [Memory Lapses Or Loss] : memory lapses or loss [Easy Bruising] : a tendency for easy bruising [Negative] : Neurological [Easy Bleeding] : no tendency for easy bleeding

## 2021-09-28 NOTE — DISCUSSION/SUMMARY
[FreeTextEntry1] : 1. Thoracic aortic aneurysm/Celiac Artery Aneurysm: CTA on 7/15/2021, revealed stable findings. Continue yearly follow up. Recommend continuing Toprol 25mg daily (high risk medication with no signs of toxicity). Unable to tolerate 50mg dose because of fatigue and lower BPs.  Goal BP is less than 130/80 and HRs in the 60s. 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 HTN: see plan as above.  Goal BP is less than 130/80.\par \par 3. Hyperlipidemia. Continue atorvastatin 20mg daily.\par \par 4. Aortic insufficiency: periodic echo surveillance. \par \par Follow up in 6 months.

## 2021-10-27 ENCOUNTER — APPOINTMENT (OUTPATIENT)
Dept: CARDIOLOGY | Facility: CLINIC | Age: 76
End: 2021-10-27
Payer: MEDICARE

## 2021-10-27 VITALS
DIASTOLIC BLOOD PRESSURE: 70 MMHG | OXYGEN SATURATION: 98 % | SYSTOLIC BLOOD PRESSURE: 128 MMHG | BODY MASS INDEX: 26.98 KG/M2 | HEART RATE: 60 BPM | WEIGHT: 217 LBS | HEIGHT: 75 IN

## 2021-10-27 DIAGNOSIS — Z79.899 OTHER LONG TERM (CURRENT) DRUG THERAPY: ICD-10-CM

## 2021-10-27 DIAGNOSIS — I72.8 ANEURYSM OF OTHER SPECIFIED ARTERIES: ICD-10-CM

## 2021-10-27 DIAGNOSIS — E78.5 HYPERLIPIDEMIA, UNSPECIFIED: ICD-10-CM

## 2021-10-27 DIAGNOSIS — I71.2 THORACIC AORTIC ANEURYSM, W/OUT RUPTURE: ICD-10-CM

## 2021-10-27 DIAGNOSIS — I10 ESSENTIAL (PRIMARY) HYPERTENSION: ICD-10-CM

## 2021-10-27 DIAGNOSIS — I35.1 NONRHEUMATIC AORTIC (VALVE) INSUFFICIENCY: ICD-10-CM

## 2021-10-27 PROCEDURE — 99215 OFFICE O/P EST HI 40 MIN: CPT

## 2021-10-27 NOTE — PHYSICAL EXAM
[Normal] : moves all extremities, no focal deficits, normal speech [de-identified] : No JVD, no carotid artery bruits auscultated bilaterally

## 2021-10-27 NOTE — CARDIOLOGY SUMMARY
[de-identified] : 06/7/2021, NSR, low voltages. [de-identified] : 12/11/2020, trace MR, mild-moderate AI, aortic root 4.8cm, ascending aorta at 4.6cm, trace TR with normal estimated PASP. LVEF 60-65%.

## 2021-10-27 NOTE — DISCUSSION/SUMMARY
[FreeTextEntry1] : 1. Thoracic aortic aneurysm/Celiac Artery Aneurysm: CTA on 7/15/2021, revealed stable findings. Continue yearly follow up. Recommend continuing Toprol 25mg daily (high risk medication with no signs of toxicity). Unable to tolerate 50mg dose because of fatigue and lower BPs.  Goal BP is less than 130/80 and HRs in the 60s. 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 HTN: see plan as above.  Goal BP is less than 130/80.\par \par 3. Hyperlipidemia. Continue atorvastatin 20mg daily.\par \par 4. Aortic insufficiency: periodic echo surveillance. \par \par Patient seen because of "abnormal ECG" done at PCP office. Computer read it as junctional rhythm. I reviewed the tracing. It is NSR. No change compared to previous ECG.\par \par Follow up in 6 months.

## 2021-12-03 ENCOUNTER — APPOINTMENT (OUTPATIENT)
Dept: CARDIOLOGY | Facility: CLINIC | Age: 76
End: 2021-12-03
Payer: MEDICARE

## 2021-12-03 PROCEDURE — 93306 TTE W/DOPPLER COMPLETE: CPT

## 2021-12-10 ENCOUNTER — APPOINTMENT (OUTPATIENT)
Dept: CARDIOLOGY | Facility: CLINIC | Age: 76
End: 2021-12-10

## 2021-12-20 ENCOUNTER — NON-APPOINTMENT (OUTPATIENT)
Age: 76
End: 2021-12-20

## 2021-12-20 ENCOUNTER — APPOINTMENT (OUTPATIENT)
Dept: OPHTHALMOLOGY | Facility: CLINIC | Age: 76
End: 2021-12-20
Payer: MEDICARE

## 2021-12-20 PROCEDURE — 92014 COMPRE OPH EXAM EST PT 1/>: CPT

## 2022-04-28 ENCOUNTER — APPOINTMENT (OUTPATIENT)
Dept: CARDIOLOGY | Facility: CLINIC | Age: 77
End: 2022-04-28

## 2022-06-27 ENCOUNTER — NON-APPOINTMENT (OUTPATIENT)
Age: 77
End: 2022-06-27

## 2022-06-27 ENCOUNTER — APPOINTMENT (OUTPATIENT)
Dept: OPHTHALMOLOGY | Facility: CLINIC | Age: 77
End: 2022-06-27

## 2022-06-27 PROCEDURE — 92014 COMPRE OPH EXAM EST PT 1/>: CPT

## 2022-11-07 ENCOUNTER — NON-APPOINTMENT (OUTPATIENT)
Age: 77
End: 2022-11-07

## 2022-11-07 ENCOUNTER — APPOINTMENT (OUTPATIENT)
Dept: OPHTHALMOLOGY | Facility: CLINIC | Age: 77
End: 2022-11-07

## 2022-11-07 PROCEDURE — 99214 OFFICE O/P EST MOD 30 MIN: CPT

## 2022-11-07 PROCEDURE — 92201 OPSCPY EXTND RTA DRAW UNI/BI: CPT

## 2022-11-28 ENCOUNTER — APPOINTMENT (OUTPATIENT)
Dept: OPHTHALMOLOGY | Facility: CLINIC | Age: 77
End: 2022-11-28

## 2022-11-28 ENCOUNTER — NON-APPOINTMENT (OUTPATIENT)
Age: 77
End: 2022-11-28

## 2022-11-28 PROCEDURE — 92201 OPSCPY EXTND RTA DRAW UNI/BI: CPT | Mod: RT

## 2022-11-28 PROCEDURE — 99213 OFFICE O/P EST LOW 20 MIN: CPT

## 2023-05-08 NOTE — PHYSICAL EXAM
[Normal] : moves all extremities, no focal deficits, normal speech [de-identified] : No JVD, no carotid artery bruits auscultated bilaterally [Gag Reflex] : gag reflex preventing mirror examination [None] : none [Flexible Endoscope] : examined with the flexible endoscope [Serial Number: ___] : Serial Number: [unfilled] [de-identified] : No lesions in the NPx, OPx, HPx or larynx.  VC are mobile, airway patent.\par

## 2023-06-05 ENCOUNTER — NON-APPOINTMENT (OUTPATIENT)
Age: 78
End: 2023-06-05

## 2023-06-05 ENCOUNTER — APPOINTMENT (OUTPATIENT)
Dept: OPHTHALMOLOGY | Facility: CLINIC | Age: 78
End: 2023-06-05
Payer: MEDICARE

## 2023-06-05 PROCEDURE — 92014 COMPRE OPH EXAM EST PT 1/>: CPT

## 2023-12-04 ENCOUNTER — NON-APPOINTMENT (OUTPATIENT)
Age: 78
End: 2023-12-04

## 2023-12-04 ENCOUNTER — APPOINTMENT (OUTPATIENT)
Dept: OPHTHALMOLOGY | Facility: CLINIC | Age: 78
End: 2023-12-04
Payer: MEDICARE

## 2023-12-04 PROCEDURE — 92014 COMPRE OPH EXAM EST PT 1/>: CPT

## 2023-12-04 PROCEDURE — 76514 ECHO EXAM OF EYE THICKNESS: CPT

## 2024-04-23 ENCOUNTER — APPOINTMENT (OUTPATIENT)
Dept: NEUROSURGERY | Facility: CLINIC | Age: 79
End: 2024-04-23
Payer: MEDICARE

## 2024-04-23 VITALS
BODY MASS INDEX: 26.98 KG/M2 | HEIGHT: 75 IN | SYSTOLIC BLOOD PRESSURE: 133 MMHG | DIASTOLIC BLOOD PRESSURE: 64 MMHG | WEIGHT: 217 LBS

## 2024-04-23 DIAGNOSIS — M47.816 SPONDYLOSIS W/OUT MYELOPATHY OR RADICULOPATHY, LUMBAR REGION: ICD-10-CM

## 2024-04-23 DIAGNOSIS — M48.061 SPINAL STENOSIS, LUMBAR REGION WITHOUT NEUROGENIC CLAUDICATION: ICD-10-CM

## 2024-04-23 PROCEDURE — 99204 OFFICE O/P NEW MOD 45 MIN: CPT

## 2024-04-23 NOTE — DATA REVIEWED
[de-identified] : Was reviewed of the lumbar spine (P RAD #[id number]) multilevel lumbar disc bulge; lumbar stenosis and foraminal narrowing; lumbar spondylosis

## 2024-04-23 NOTE — ASSESSMENT
[FreeTextEntry1] : Discussed the history, physical examination findings, and recent imaging with the patient and his wife with all questions answered.  After review of the imaging and clinical exam, discussed that we will initiate conservative treatment with rest and modified activity along with physical therapy.  The physical therapy contact list and referral has been provided.  There is no role for acute neurosurgical intervention at this time.  The patient may follow-up in 6 weeks to note progress with physical therapy.  Discussed that we may include pain management if symptoms have not improved.

## 2024-04-23 NOTE — REASON FOR VISIT
[New Patient Visit] : a new patient visit [FreeTextEntry1] : Pt is here with complaints of lower back pain.

## 2024-04-23 NOTE — HISTORY OF PRESENT ILLNESS
[de-identified] : 78-year-old male presents to the neurosurgery office accompanied by his wife for an initial office visit for evaluation of lumbar MRI findings.  The patient was referred to our office by her primary care physician (Dr. Bobby Bernstein) the patient denies any back pain.  He does report intermittent lower extremity symptoms.  The patient ambulates unassisted.  He does have an MRI available for review from [facility].  No conservative treatment and no medications for this condition at this time other than over-the-counter anti-inflammatory medications

## 2024-06-10 ENCOUNTER — NON-APPOINTMENT (OUTPATIENT)
Age: 79
End: 2024-06-10

## 2024-06-10 ENCOUNTER — APPOINTMENT (OUTPATIENT)
Dept: OPHTHALMOLOGY | Facility: CLINIC | Age: 79
End: 2024-06-10
Payer: MEDICARE

## 2024-06-10 PROCEDURE — 92014 COMPRE OPH EXAM EST PT 1/>: CPT

## 2024-12-02 ENCOUNTER — NON-APPOINTMENT (OUTPATIENT)
Age: 79
End: 2024-12-02

## 2024-12-02 ENCOUNTER — APPOINTMENT (OUTPATIENT)
Dept: OPHTHALMOLOGY | Facility: CLINIC | Age: 79
End: 2024-12-02
Payer: MEDICARE

## 2024-12-02 PROCEDURE — 92014 COMPRE OPH EXAM EST PT 1/>: CPT

## 2024-12-25 PROBLEM — F10.90 ALCOHOL USE: Status: ACTIVE | Noted: 2017-12-15

## 2025-02-11 ENCOUNTER — APPOINTMENT (OUTPATIENT)
Dept: UROLOGY | Facility: CLINIC | Age: 80
End: 2025-02-11

## 2025-04-11 NOTE — DISCUSSION/SUMMARY
Addended by: BEBO CAMARA on: 4/11/2025 08:55 AM     Modules accepted: Orders     [FreeTextEntry1] : #1Labs are reviewed. Stable. Findings, discussed.\par Increasing fatigue, tiredness, and exertional dyspnea. I have recommended him to have repeat echocardiogram along with stress myocardial perfusion scan if needed with pharmacological means to assess evaluative ejection fraction, exercise tolerance, blood pressure, heart rate response and rule out any significant obstructive coronary artery disease.\par #2 thoracic aortic aneurysm, stable. CTA of the chest with contrast has been recommended to followup on thoracic aortic aneurysm.\par CTA of the abdomen with contrast is recommended to followup with celiac artery aneurysm and subsequent follow with vascular surgery.\par Renal function is normal. He did not have any significant allergic reaction to contrast in the past. Reviewed risks and benefits of the test with him in relation to contrast injection and radiation. If there is change in clinical status. He understands to call 911 and go to the nearest emergency room.\par #3 hypertensive heart disease. Without congestive heart failure or renal insufficiency appears stable. Blood pressure. Continue with present medications in the form of metoprolol.\par #4 hyperlipidemia. Continue atorvastatin.\par #5 celiac artery aneurysm. Follow with vascular surgery.\par #6 weight reduction.\par #7 non-rheumatic aortic insufficiency. Continue to follow. stable.\par \par Counseling regarding low saturated fat, salt and carbohydrate intake was reviewed. Active lifestyle and regular. Exercise along with weight management is advised.\par \par All the above were at length reviewed. Answered all the questions. Thank you very much for this kind referral. Please do not hesitate to give me a call for any question.\par Part of this transcription was done with voice recognition software and phonetically similar errors are common. I apologize for that. Please donot hesitate to call for any questions due to above.\par \par f/u  after tests